# Patient Record
(demographics unavailable — no encounter records)

---

## 2024-12-04 NOTE — HISTORY OF PRESENT ILLNESS
[FreeTextEntry1] : 41 yo M with migraines presents for migraine mangement  started 10 years ago, started  Variable location more frontal, continuous pressure sensation Frequency- every 2-3 days, about 15 headache days per month Duration- lasts all day No aura A/w light and sound sensitivity No clear triggers- possibly related to air pressure med trials-  Nurtex 75mg every other day for past year qualipta, ubrevly, sumatriptan, TPM (did not work)    has not  last week tuesday- thur  Social Hx:

## 2025-02-06 NOTE — HISTORY OF PRESENT ILLNESS
[FreeTextEntry1] : Interim Hx: 2/6/2025  Continues to have migraines 2-3x/week. Naprosyn ineffective Has been taking diclofenac prn  Also has neck pain, received trigger injection 2 weeks ago   ____________________ Initial Hx: 12/4/2024 43 yo M with migraines presents for chronic migraine management   Started 10 years ago Variable location more frontal, continuous pressure sensation Frequency- every 2-3 days, about 15 headache days per month Duration- lasts all day No aura A/w light and sound sensitivity No clear triggers- possibly related to air pressure med trials-  Nurtec 75mg every other day for past year TPM, qulipta, ubrevly, sumatriptan (did not work)   Social Hx:

## 2025-02-06 NOTE — PROCEDURE
[FreeTextEntry1] : Botox [FreeTextEntry2] : chronic migraine [FreeTextEntry4] : ethyl chloride spray [FreeTextEntry3] : Patient was consented with explanation of risks and benefits including black box warnings and explanation of alternative treatments.   Patient was injected in the sitting position with ethyl chloride spray used before each injection.  Muscles injected: Procerus - 5 units  - 5 units in each side = 10 units Frontalis - 5 units in 2 injections on each side = 20 units Temporalis - 5 units in 4 injection in each side = 40 units Occipitalis - 5 units in 3 injections on each side = 30 units Cervical paraspinals - 5 units in 2 injections on each side = 20 Trapezius - 5 units in 3 injections on each side = 30  Total used 155 units Onabotulinum toxin Total wasted = 45 Total billable = 200 units  Patient tolerated procedure well with minimal pain and bleeding.

## 2025-02-06 NOTE — PHYSICAL EXAM
[General Appearance - Alert] : alert [General Appearance - In No Acute Distress] : in no acute distress [Oriented To Time, Place, And Person] : oriented to person, place, and time [Person] : oriented to person [Place] : oriented to place [Time] : oriented to time [Fluency] : fluency intact [Cranial Nerves Optic (II)] : visual acuity intact bilaterally,  visual fields full to confrontation, pupils equal round and reactive to light [Cranial Nerves Oculomotor (III)] : extraocular motion intact [Cranial Nerves Trigeminal (V)] : facial sensation intact symmetrically [Cranial Nerves Facial (VII)] : face symmetrical [Cranial Nerves Vestibulocochlear (VIII)] : hearing was intact bilaterally [Cranial Nerves Accessory (XI - Cranial And Spinal)] : head turning and shoulder shrug symmetric [Motor Tone] : muscle tone was normal in all four extremities [Motor Strength] : muscle strength was normal in all four extremities [Sensation Tactile Decrease] : light touch was intact [Romberg's Sign] : Romberg's sign was negtive [Abnormal Walk] : normal gait

## 2025-02-06 NOTE — PHYSICAL EXAM
[General Appearance - Alert] : alert [General Appearance - In No Acute Distress] : in no acute distress [Oriented To Time, Place, And Person] : oriented to person, place, and time [Person] : oriented to person [Place] : oriented to place [Time] : oriented to time [Fluency] : fluency intact [Cranial Nerves Optic (II)] : visual acuity intact bilaterally,  visual fields full to confrontation, pupils equal round and reactive to light [Cranial Nerves Oculomotor (III)] : extraocular motion intact [Cranial Nerves Trigeminal (V)] : facial sensation intact symmetrically [Cranial Nerves Facial (VII)] : face symmetrical [Cranial Nerves Vestibulocochlear (VIII)] : hearing was intact bilaterally [Cranial Nerves Accessory (XI - Cranial And Spinal)] : head turning and shoulder shrug symmetric [Motor Tone] : muscle tone was normal in all four extremities Attending Attestation (For Attendings USE Only)... [Motor Strength] : muscle strength was normal in all four extremities Attending Attestation (For Attendings USE Only)... [Sensation Tactile Decrease] : light touch was intact [Romberg's Sign] : Romberg's sign was negtive [Abnormal Walk] : normal gait Attending Attestation (For Attendings USE Only)...

## 2025-02-06 NOTE — HISTORY OF PRESENT ILLNESS
[FreeTextEntry1] : Interim Hx: 2/6/2025  Continues to have migraines 2-3x/week. Naprosyn ineffective Has been taking diclofenac prn  Also has neck pain, received trigger injection 2 weeks ago   ____________________ Initial Hx: 12/4/2024 41 yo M with migraines presents for chronic migraine management   Started 10 years ago Variable location more frontal, continuous pressure sensation Frequency- every 2-3 days, about 15 headache days per month Duration- lasts all day No aura A/w light and sound sensitivity No clear triggers- possibly related to air pressure med trials-  Nurtec 75mg every other day for past year TPM, qulipta, ubrevly, sumatriptan (did not work)   Social Hx:

## 2025-02-06 NOTE — DISCUSSION/SUMMARY
[FreeTextEntry1] : 41 yo M with migraines presents for chronic migraine management , ~15 headache days per month Med trials- Nurtec, TPM, qulipta, ubrevly, sumatriptan (did not work)  plan: S/p Botox today Continue nurtec 75mg every other day Trial of rizatriptan PRN F/u in 3 months

## 2025-02-06 NOTE — DISCUSSION/SUMMARY
[FreeTextEntry1] : 43 yo M with migraines presents for chronic migraine management , ~15 headache days per month Med trials- Nurtec, TPM, qulipta, ubrevly, sumatriptan (did not work)  plan: S/p Botox today Continue nurtec 75mg every other day Trial of rizatriptan PRN F/u in 3 months

## 2025-05-05 NOTE — HISTORY OF PRESENT ILLNESS
[FreeTextEntry1] : Interim Hx: 5/5/2025  Started noticing improvement in migraines about a month after first botox treatment. Increased again over past few weeks Tried rizatriptan and did not like how it made him feel  EMG was ordered for left hand and foot numbness/tingling but not scheduled yet  _____________________ Interim Hx: 2/6/2025  Continues to have migraines 2-3x/week. Naprosyn ineffective Has been taking diclofenac prn  Also has neck pain, received trigger injection 2 weeks ago   ____________________ Initial Hx: 12/4/2024 43 yo M with migraines presents for chronic migraine management   Started 10 years ago Variable location more frontal, continuous pressure sensation Frequency- every 2-3 days, about 15 headache days per month Duration- lasts all day No aura A/w light and sound sensitivity No clear triggers- possibly related to air pressure med trials-  Nurtec 75mg every other day for past year TPM, qulipta, ubrevly, sumatriptan (did not work)   Social Hx:

## 2025-05-05 NOTE — HISTORY OF PRESENT ILLNESS
[FreeTextEntry1] : Interim Hx: 5/5/2025  Started noticing improvement in migraines about a month after first botox treatment. Increased again over past few weeks Tried rizatriptan and did not like how it made him feel  EMG was ordered for left hand and foot numbness/tingling but not scheduled yet  _____________________ Interim Hx: 2/6/2025  Continues to have migraines 2-3x/week. Naprosyn ineffective Has been taking diclofenac prn  Also has neck pain, received trigger injection 2 weeks ago   ____________________ Initial Hx: 12/4/2024 41 yo M with migraines presents for chronic migraine management   Started 10 years ago Variable location more frontal, continuous pressure sensation Frequency- every 2-3 days, about 15 headache days per month Duration- lasts all day No aura A/w light and sound sensitivity No clear triggers- possibly related to air pressure med trials-  Nurtec 75mg every other day for past year TPM, qulipta, ubrevly, sumatriptan (did not work)   Social Hx:

## 2025-05-05 NOTE — DISCUSSION/SUMMARY
[FreeTextEntry1] : 41 yo M with migraines presents for chronic migraine management , ~15 headache days per month Med trials- Nurtec, TPM, qulipta, ubrevly, sumatriptan (did not work)  plan: S/p Botox today Continue nurtec 75mg every other day EMG/NCS for Left hand and foot numbness/tingling F/u in 3 months

## 2025-05-05 NOTE — PHYSICAL EXAM
[General Appearance - Alert] : alert [General Appearance - In No Acute Distress] : in no acute distress [Oriented To Time, Place, And Person] : oriented to person, place, and time [Person] : oriented to person [Place] : oriented to place [Time] : oriented to time [Fluency] : fluency intact [Cranial Nerves Optic (II)] : visual acuity intact bilaterally,  visual fields full to confrontation, pupils equal round and reactive to light [Cranial Nerves Oculomotor (III)] : extraocular motion intact [Cranial Nerves Trigeminal (V)] : facial sensation intact symmetrically [Cranial Nerves Facial (VII)] : face symmetrical [Cranial Nerves Vestibulocochlear (VIII)] : hearing was intact bilaterally [Cranial Nerves Accessory (XI - Cranial And Spinal)] : head turning and shoulder shrug symmetric [Motor Tone] : muscle tone was normal in all four extremities [Motor Strength] : muscle strength was normal in all four extremities [Sensation Tactile Decrease] : light touch was intact [Abnormal Walk] : normal gait [Romberg's Sign] : Romberg's sign was negtive

## 2025-05-20 NOTE — PHYSICAL EXAM
[General Appearance - Alert] : alert [General Appearance - In No Acute Distress] : in no acute distress [Oriented To Time, Place, And Person] : oriented to person, place, and time [Impaired Insight] : insight and judgment were intact [Affect] : the affect was normal [Person] : oriented to person [Place] : oriented to place [Time] : oriented to time [Concentration Intact] : normal concentrating ability [Visual Intact] : visual attention was ~T not ~L decreased [Naming Objects] : no difficulty naming common objects [Repeating Phrases] : no difficulty repeating a phrase [Writing A Sentence] : no difficulty writing a sentence [Fluency] : fluency intact [Comprehension] : comprehension intact [Reading] : reading intact [Past History] : adequate knowledge of personal past history [Cranial Nerves Optic (II)] : visual acuity intact bilaterally,  visual fields full to confrontation, pupils equal round and reactive to light [Cranial Nerves Oculomotor (III)] : extraocular motion intact [Cranial Nerves Trigeminal (V)] : facial sensation intact symmetrically [Cranial Nerves Facial (VII)] : face symmetrical [Cranial Nerves Vestibulocochlear (VIII)] : hearing was intact bilaterally [Cranial Nerves Glossopharyngeal (IX)] : tongue and palate midline [Cranial Nerves Accessory (XI - Cranial And Spinal)] : head turning and shoulder shrug symmetric [Cranial Nerves Hypoglossal (XII)] : there was no tongue deviation with protrusion [Motor Tone] : muscle tone was normal in all four extremities [Motor Strength] : muscle strength was normal in all four extremities [No Muscle Atrophy] : normal bulk in all four extremities [Sensation Tactile Decrease] : light touch was intact [Abnormal Walk] : normal gait [Balance] : balance was intact [2+] : Ankle jerk left 2+ [Motor Handedness Right-Handed] : the patient is right hand dominant [Sensation Vibration Decrease] : vibration was intact [Proprioception] : proprioception was intact [3+] : Brachioradialis left 3+ [] : the neck was supple [Edema] : there was no peripheral edema [No Spinal Tenderness] : no spinal tenderness [Involuntary Movements] : no involuntary movements were seen [Past-pointing] : there was no past-pointing [Tremor] : no tremor present [Plantar Reflex Right Only] : normal on the right [Plantar Reflex Left Only] : normal on the left [___] : absent on the right [___] : absent on the left [FreeTextEntry1] : healed scar left anterior ankle

## 2025-05-20 NOTE — PROCEDURE
[FreeTextEntry1] : 5/20/25 NCS/EMG: mild chronic left C7, C8 and T1 radiculopathy, superimposed minimal left ulnar nerve entrapment at wrist, mild left L5 and S1 radiculopathy.

## 2025-05-20 NOTE — DISCUSSION/SUMMARY
[FreeTextEntry1] : The electrodiagnostic abnormality is responsible for the patient's active symptoms referred for the study. Patient is to have further work up and management by referring physician.

## 2025-05-20 NOTE — HISTORY OF PRESENT ILLNESS
[FreeTextEntry1] : Patient is referred to have electrodiagnostic studies to discern neurogenic/myogenic dysfunction responsible for symptoms of left upper and lower extremities.  He developed tingling of left hand for about 1 year. The tingling is affecting digits 3-5. He endorses history of neck pain and left shoulder pain. However, he can't identify consistent trigger of the tingling in the left hand. He used to feel the hand  of left hand is not as strong. At this point, there is no real weakness.  He recently developed tingling of left foot, probably started in the sole and now, most area of the foot is affected. He used to have cramps in calves intermittently. He endorses some degree of pain of left paralumbar region, though no radiating pain.   He feels balance is not as good because of vestibular symptoms. He denies weakness of lower extremities.